# Patient Record
Sex: FEMALE | Race: WHITE | NOT HISPANIC OR LATINO | Employment: FULL TIME | URBAN - METROPOLITAN AREA
[De-identification: names, ages, dates, MRNs, and addresses within clinical notes are randomized per-mention and may not be internally consistent; named-entity substitution may affect disease eponyms.]

---

## 2021-04-02 ENCOUNTER — TELEPHONE (OUTPATIENT)
Dept: OBGYN CLINIC | Facility: CLINIC | Age: 51
End: 2021-04-02

## 2021-04-02 PROBLEM — Z86.018 HISTORY OF UTERINE FIBROID: Status: ACTIVE | Noted: 2021-04-02

## 2021-04-02 NOTE — TELEPHONE ENCOUNTER
Spoke with pt, aware of u/s with overall smaller uterus and dominant fibroid, curious additional 5 cm fibroid not noted in past (but overall uterus smaller) and normal ovaries  Will decrease progesterone from BID to daily x 20 days next month and call with bleeding issues  Repeat u/s one year or prn changes   Agrees to plan

## 2021-07-22 ENCOUNTER — TELEPHONE (OUTPATIENT)
Dept: OBGYN CLINIC | Facility: CLINIC | Age: 51
End: 2021-07-22

## 2021-07-22 DIAGNOSIS — Z86.018 HISTORY OF UTERINE FIBROID: Primary | ICD-10-CM

## 2021-07-22 NOTE — TELEPHONE ENCOUNTER
Ingrid l/m requesting refill of norethindrone 5mg    L/M on patient work and cell v/m to call back to discuss  See prior notes to decrease to 5mg daily x 20days (5-24)and report any bleeding problems

## 2021-09-28 ENCOUNTER — TELEPHONE (OUTPATIENT)
Dept: OBGYN CLINIC | Facility: CLINIC | Age: 51
End: 2021-09-28

## 2021-09-28 NOTE — TELEPHONE ENCOUNTER
Ingrid levin she was instructed by Dr Jarrod Morejon to immediately stop her norethindrone due to abnormal labs  Return call to [facility], inquired on what were the abnormal labs  She states her hemoglobin and hematocrit were elevated and she recently had to start medication for high blood pressure  Dr Bennie Butler feels it is due to the norethindrone  She is going to recheck labs in a few months  Advised will forward to DR Chacon to review

## 2021-09-28 NOTE — TELEPHONE ENCOUNTER
Spoke with pt, will drop off copy of labs in [facility] for review  Pt would like to stop aygestin, due to increase in hg/hct and BP concerns  Cycle warnings given  Agrees to plan

## 2022-05-11 PROBLEM — D25.0 SUBMUCOUS LEIOMYOMA OF UTERUS: Status: ACTIVE | Noted: 2022-05-11

## 2022-05-11 PROBLEM — Z85.820 PERSONAL HISTORY OF MALIGNANT MELANOMA: Status: ACTIVE | Noted: 2022-05-11

## 2022-05-11 NOTE — PROGRESS NOTES
Assessment/Plan:    Encounter for gynecological examination (general) (routine) without abnormal findings  Stopped progesterone cycling in 9/2021, tolerable irregular, less heavy menses to follow  LMP 3/27/22  No complaints  Normal breast and pelvic exams  Pap done  Mammo order given  Up to date with colonoscopy  History of uterine fibroid  Last u/s 4/2021  Normal exam  Will recheck u/s, order given       Diagnoses and all orders for this visit:    Encounter for gynecological examination (general) (routine) without abnormal findings    Encounter for screening mammogram for breast cancer  -     Mammo screening bilateral w 3d & cad; Future    History of uterine fibroid  -     US pelvis complete w transvaginal; Future    Screening for malignant neoplasm of the cervix  -     Thinprep Tis Pap and HPV mRNA E6/E7 Reflex HPV 16,18/45    Other orders  -     nebivolol (BYSTOLIC) 10 mg tablet; Take 10 mg by mouth in the morning  Subjective:      Patient ID: Camilo Gowers is a 46 y o  female  Here for well check  The following portions of the patient's history were reviewed and updated as appropriate:   She  has a past medical history of H/O mammogram (08/2020), Personal history of malignant melanoma, and Submucous leiomyoma of uterus  She   Patient Active Problem List    Diagnosis Date Noted    Encounter for gynecological examination (general) (routine) without abnormal findings 05/12/2022    Submucous leiomyoma of uterus 05/11/2022    Personal history of malignant melanoma 05/11/2022    History of uterine fibroid 04/02/2021     She  has a past surgical history that includes Knee arthroscopy w/ meniscectomy (Left); Mammo (historical) (08/2020); and Colonoscopy (05/2021)  Her family history is not on file  She  reports that she has never smoked  She has never used smokeless tobacco  She reports current alcohol use of about 1 0 standard drink of alcohol per week   She reports that she does not use drugs  Current Outpatient Medications   Medication Sig Dispense Refill    nebivolol (BYSTOLIC) 10 mg tablet Take 10 mg by mouth in the morning   norethindrone (AYGESTIN) 5 mg tablet Take one tablet daily, days 5-24 of cycle  60 tablet 3     No current facility-administered medications for this visit  She is allergic to compazine [prochlorperazine]       Review of Systems  No breast, bladder, bowel changes   No new persistent pain, bloating, early satiety or pelvic pressure      Objective:      /70   Ht 5' 6" (1 676 m)   Wt 74 8 kg (165 lb)   LMP 03/27/2022 (Exact Date) Comment: irregular  Breastfeeding No   BMI 26 63 kg/m²          Physical Exam  General appearance: no distress, pleasant  Neck: thyroid without nodules or thyromegaly, no palpable adenopathy  Lymph nodes: no palpable adenopathy  Breasts: no masses, nodes or skin changes  Abdomen: soft, non tender, no palpable masses  Pelvic exam: normal external genitalia, urethral meatus normal, vagina without lesions, cervix without lesions, uterus small, non tender, no adnexal masses, non tender  Rectal exam: no masses, non tender, RV confirms above

## 2022-05-12 ENCOUNTER — ANNUAL EXAM (OUTPATIENT)
Dept: OBGYN CLINIC | Facility: CLINIC | Age: 52
End: 2022-05-12
Payer: COMMERCIAL

## 2022-05-12 VITALS
WEIGHT: 165 LBS | DIASTOLIC BLOOD PRESSURE: 70 MMHG | BODY MASS INDEX: 26.52 KG/M2 | HEIGHT: 66 IN | SYSTOLIC BLOOD PRESSURE: 110 MMHG

## 2022-05-12 DIAGNOSIS — Z01.419 ENCOUNTER FOR GYNECOLOGICAL EXAMINATION (GENERAL) (ROUTINE) WITHOUT ABNORMAL FINDINGS: Primary | ICD-10-CM

## 2022-05-12 DIAGNOSIS — Z12.31 ENCOUNTER FOR SCREENING MAMMOGRAM FOR BREAST CANCER: ICD-10-CM

## 2022-05-12 DIAGNOSIS — Z86.018 HISTORY OF UTERINE FIBROID: ICD-10-CM

## 2022-05-12 DIAGNOSIS — Z12.4 SCREENING FOR MALIGNANT NEOPLASM OF THE CERVIX: ICD-10-CM

## 2022-05-12 PROCEDURE — S0612 ANNUAL GYNECOLOGICAL EXAMINA: HCPCS | Performed by: OBSTETRICS & GYNECOLOGY

## 2022-05-12 RX ORDER — NEBIVOLOL 10 MG/1
10 TABLET ORAL DAILY
COMMUNITY
Start: 2022-05-02

## 2022-05-12 NOTE — LETTER
May 12, 2022     Dariela Gentile DO  [address] [facility],[address]  [address] [place] [address]    Patient: Faby Bowles   YOB: 1970   Date of Visit: 5/12/2022       Dear Dr Cori Valerio: Thank you for referring Faby Bowles to me for evaluation  Below are my notes for this consultation  If you have questions, please do not hesitate to call me  I look forward to following your patient along with you  Sincerely,        Dc Aguilar MD        CC: No Recipients  Dc Aguilar MD  5/12/2022  3:44 PM  Incomplete  Assessment/Plan:    Encounter for gynecological examination (general) (routine) without abnormal findings  Stopped progesterone cycling in 9/2021, tolerable irregular, less heavy menses to follow  LMP 3/27/22  No complaints  Normal breast and pelvic exams  Pap done  Mammo order given  Up to date with colonoscopy  History of uterine fibroid  Last u/s 4/2021  Normal exam  Will recheck u/s, order given       Diagnoses and all orders for this visit:    Encounter for gynecological examination (general) (routine) without abnormal findings    Encounter for screening mammogram for breast cancer  -     Mammo screening bilateral w 3d & cad; Future    History of uterine fibroid  -     US pelvis complete w transvaginal; Future    Screening for malignant neoplasm of the cervix  -     Thinprep Tis Pap and HPV mRNA E6/E7 Reflex HPV 16,18/45    Other orders  -     nebivolol (BYSTOLIC) 10 mg tablet; Take 10 mg by mouth in the morning  Subjective:      Patient ID: Faby Bowles is a 46 y o  female  Here for well check  The following portions of the patient's history were reviewed and updated as appropriate:   She  has a past medical history of H/O mammogram (08/2020), Personal history of malignant melanoma, and Submucous leiomyoma of uterus    She   Patient Active Problem List    Diagnosis Date Noted    Encounter for gynecological examination (general) (routine) without abnormal findings 05/12/2022    Submucous leiomyoma of uterus 05/11/2022    Personal history of malignant melanoma 05/11/2022    History of uterine fibroid 04/02/2021     She  has a past surgical history that includes Knee arthroscopy w/ meniscectomy (Left); Mammo (historical) (08/2020); and Colonoscopy (05/2021)  Her family history is not on file  She  reports that she has never smoked  She has never used smokeless tobacco  She reports current alcohol use of about 1 0 standard drink of alcohol per week  She reports that she does not use drugs  Current Outpatient Medications   Medication Sig Dispense Refill    nebivolol (BYSTOLIC) 10 mg tablet Take 10 mg by mouth in the morning   norethindrone (AYGESTIN) 5 mg tablet Take one tablet daily, days 5-24 of cycle  60 tablet 3     No current facility-administered medications for this visit  She is allergic to compazine [prochlorperazine]       Review of Systems  No breast, bladder, bowel changes  No new persistent pain, bloating, early satiety or pelvic pressure      Objective:      /70   Ht 5' 6" (1 676 m)   Wt 74 8 kg (165 lb)   LMP 03/27/2022 (Exact Date) Comment: irregular  Breastfeeding No   BMI 26 63 kg/m²          Physical Exam  General appearance: no distress, pleasant  Neck: thyroid without nodules or thyromegaly, no palpable adenopathy  Lymph nodes: no palpable adenopathy  Breasts: no masses, nodes or skin changes  Abdomen: soft, non tender, no palpable masses  Pelvic exam: normal external genitalia, urethral meatus normal, vagina without lesions, cervix without lesions, uterus small, non tender, no adnexal masses, non tender  Rectal exam: no masses, non tender, RV confirms above      Diane Killina MD  5/11/2022 10:34 AM  Sign when Signing Visit  Assessment/Plan:    No problem-specific Assessment & Plan notes found for this encounter         Diagnoses and all orders for this visit:    Encounter for screening mammogram for breast cancer Subjective:      Patient ID: Tejas Damian is a 46 y o  female  Here for well check  The following portions of the patient's history were reviewed and updated as appropriate:   She  has a past medical history of H/O mammogram (08/2020), Personal history of malignant melanoma, and Submucous leiomyoma of uterus  She   Patient Active Problem List    Diagnosis Date Noted    Submucous leiomyoma of uterus 05/11/2022    Personal history of malignant melanoma 05/11/2022    History of uterine fibroid 04/02/2021     She  has a past surgical history that includes Knee arthroscopy w/ meniscectomy (Left) and Mammo (historical) (08/2020)  Her family history is not on file  She  reports that she has never smoked  She does not have any smokeless tobacco history on file  No history on file for alcohol use and drug use  Current Outpatient Medications   Medication Sig Dispense Refill    norethindrone (AYGESTIN) 5 mg tablet Take one tablet daily, days 5-24 of cycle  60 tablet 3     No current facility-administered medications for this visit  She has no allergies on file       Review of Systems      Objective: There were no vitals taken for this visit           Physical Exam

## 2022-05-12 NOTE — PATIENT INSTRUCTIONS
Return to office in one year unless having any problems such as breast changes, bleeding, new persistent pain, new progressive bloating, new problems eating (getting full to quickly) or new constant urinary pressure that does not resolve in one week  Please call the office if you do not hear about your results in 10-14 days       Best time to get your ultrasound is between days 8-10 of your cycle

## 2022-05-12 NOTE — ASSESSMENT & PLAN NOTE
Stopped progesterone cycling in 9/2021, tolerable irregular, less heavy menses to follow  LMP 3/27/22  No complaints  Normal breast and pelvic exams  Pap done  Mammo order given  Up to date with colonoscopy

## 2022-05-31 ENCOUNTER — TELEPHONE (OUTPATIENT)
Dept: OBGYN CLINIC | Facility: CLINIC | Age: 52
End: 2022-05-31

## 2022-05-31 NOTE — TELEPHONE ENCOUNTER
Please inform of pap with ASCUS, HPV negative, same as in 2020  Expert consensus is to repeat the pap in one year, no additional therapy or testing is indicated  Thanks

## 2022-06-06 ENCOUNTER — TELEPHONE (OUTPATIENT)
Dept: OBGYN CLINIC | Facility: CLINIC | Age: 52
End: 2022-06-06

## 2022-06-06 DIAGNOSIS — N83.292 OTHER OVARIAN CYST, LEFT SIDE: Primary | ICD-10-CM

## 2022-06-06 NOTE — TELEPHONE ENCOUNTER
Reviewed result and recommendation with Christa Ross who is in agreement to plan   Order at  box in [place] at patient request

## 2022-06-06 NOTE — TELEPHONE ENCOUNTER
Please inform of u/s results with smaller fibroids  (RV uterus 9 4 x 7 2 x 6 3 cm with two fundal fibroids 3 4 and 3 8 cm (previously 6 3 and 5 cm)  EMS 8 mm  L ov 6 cm with 4 2 cm simple cyst  R ov 2 8 cm  WNL)    Simple left ovarian cyst noted, recommend f/u imaging in 6-8 weeks to insure resolution  Order placed, look on nursing printer please

## 2022-07-28 ENCOUNTER — TELEPHONE (OUTPATIENT)
Dept: OBGYN CLINIC | Facility: CLINIC | Age: 52
End: 2022-07-28

## 2022-07-28 NOTE — TELEPHONE ENCOUNTER
Please inform of follow up pelvic ultrasound results with resolution of previously seen cyst   Thanks

## 2022-07-28 NOTE — TELEPHONE ENCOUNTER
Spoke with Lior Pan let her know her follow up ultrasound should resolution of the previous cyst   Pt aware and happy with the news

## 2022-10-03 DIAGNOSIS — Z12.31 ENCOUNTER FOR SCREENING MAMMOGRAM FOR BREAST CANCER: ICD-10-CM

## 2022-10-05 ENCOUNTER — TELEPHONE (OUTPATIENT)
Dept: OBGYN CLINIC | Facility: CLINIC | Age: 52
End: 2022-10-05

## 2023-08-01 NOTE — PROGRESS NOTES
Assessment/Plan:    Encounter for gynecological examination (general) (routine) without abnormal findings  All well, no complaints. Skipping cycles, last 23, this is the longest. Tolerable hot flashes. Normal breast and pelvic exams. Last pap 2022 ASCUS/HPV neg,  same, repeated today  Mammo order given, last 22  Colonoscopy , due        Diagnoses and all orders for this visit:    Breast cancer screening by mammogram  -     Mammo screening bilateral w 3d & cad; Future    Encounter for gynecological examination (general) (routine) without abnormal findings    Screening for malignant neoplasm of the cervix  -     Cancel: IGP, Aptima HPV, Rfx 16/18,45  -     IGP, Aptima HPV, Rfx 16/18,45    Other orders  -     Liquid-based pap, screening  -     Cholecalciferol (Vitamin D-3) 125 MCG (5000 UT) TABS; Take by mouth          Subjective:      Patient ID: Shar Noyola is a 45 y.o. female. HPI  Here for well check. The following portions of the patient's history were reviewed and updated as appropriate:   She  has a past medical history of H/O mammogram (2020), Hypertension, Personal history of malignant melanoma, and Submucous leiomyoma of uterus. She   Patient Active Problem List    Diagnosis Date Noted   • Encounter for gynecological examination (general) (routine) without abnormal findings 2022   • Submucous leiomyoma of uterus 2022   • Personal history of malignant melanoma 2022   • History of uterine fibroid 2021     She  has a past surgical history that includes Knee arthroscopy w/ meniscectomy (Left); Mammo (historical) (Bilateral, 2020); Colonoscopy (2021); and  section. Her family history includes Alzheimer's disease in her mother; Heart failure in her father; Lung disease in her son; No Known Problems in her brother, maternal grandfather, and paternal grandmother; Thyroid disease in her daughter. She  reports that she has never smoked. She has never used smokeless tobacco. She reports current alcohol use of about 4.0 standard drinks of alcohol per week. She reports that she does not use drugs. Current Outpatient Medications   Medication Sig Dispense Refill   • Cholecalciferol (Vitamin D-3) 125 MCG (5000 UT) TABS Take by mouth     • nebivolol (BYSTOLIC) 10 mg tablet Take 5 mg by mouth daily       No current facility-administered medications for this visit. She is allergic to compazine [prochlorperazine]. .    Review of Systems  No breast, bladder, bowel changes.  No new persistent pain, bloating, early satiety or pelvic pressure      Objective:      /74 (BP Location: Left arm, Patient Position: Sitting, Cuff Size: Large)   Ht 5' 5.75" (1.67 m)   Wt 82.2 kg (181 lb 3.2 oz)   LMP 05/21/2023   BMI 29.47 kg/m²          Physical Exam    General appearance: no distress, pleasant  Neck: thyroid without nodules or thyromegaly, no palpable adenopathy  Lymph nodes: no palpable adenopathy  Breasts: no masses, nodes or skin changes  Abdomen: soft, non tender, no palpable masses  Pelvic exam: normal external genitalia, urethral meatus normal, vagina without lesions, cervix without lesions, uterus small, non tender, no adnexal masses, non tender  Rectal exam: no masses, non tender, RV confirms above

## 2023-08-03 ENCOUNTER — ANNUAL EXAM (OUTPATIENT)
Dept: OBGYN CLINIC | Facility: CLINIC | Age: 53
End: 2023-08-03
Payer: COMMERCIAL

## 2023-08-03 VITALS
SYSTOLIC BLOOD PRESSURE: 122 MMHG | HEIGHT: 66 IN | BODY MASS INDEX: 29.12 KG/M2 | DIASTOLIC BLOOD PRESSURE: 74 MMHG | WEIGHT: 181.2 LBS

## 2023-08-03 DIAGNOSIS — Z12.31 BREAST CANCER SCREENING BY MAMMOGRAM: ICD-10-CM

## 2023-08-03 DIAGNOSIS — Z01.419 ENCOUNTER FOR GYNECOLOGICAL EXAMINATION (GENERAL) (ROUTINE) WITHOUT ABNORMAL FINDINGS: Primary | ICD-10-CM

## 2023-08-03 DIAGNOSIS — Z12.4 SCREENING FOR MALIGNANT NEOPLASM OF THE CERVIX: ICD-10-CM

## 2023-08-03 PROCEDURE — S0612 ANNUAL GYNECOLOGICAL EXAMINA: HCPCS | Performed by: OBSTETRICS & GYNECOLOGY

## 2023-08-03 NOTE — ASSESSMENT & PLAN NOTE
All well, no complaints. Skipping cycles, last 5/21/23, this is the longest. Tolerable hot flashes. Normal breast and pelvic exams.   Last pap 5/2022 ASCUS/HPV neg, 2020 same, repeated today  Mammo order given, last 9/22/22  Colonoscopy 2021, due 2028

## 2023-08-03 NOTE — LETTER
August 3, 2023     Anam Calderón, DO  [address] [address]    Patient: Loree Mo   YOB: 1970   Date of Visit: 8/3/2023       Dear Dr. Francisco Velarde: Thank you for referring Loree Mo to me for evaluation. Below are my notes for this consultation. If you have questions, please do not hesitate to call me. I look forward to following your patient along with you. Sincerely,        Van Martinez MD        CC: No Recipients    Van Martinez MD  8/3/2023  3:26 PM  Sign when Signing Visit  Assessment/Plan:    Encounter for gynecological examination (general) (routine) without abnormal findings  All well, no complaints. Skipping cycles, last 5/21/23, this is the longest. Tolerable hot flashes. Normal breast and pelvic exams. Last pap 5/2022 ASCUS/HPV neg, 2020 same, repeated today  Mammo order given, last 9/22/22  Colonoscopy 2021, due 2028      Diagnoses and all orders for this visit:    Breast cancer screening by mammogram  -     Mammo screening bilateral w 3d & cad; Future    Encounter for gynecological examination (general) (routine) without abnormal findings    Screening for malignant neoplasm of the cervix  -     Cancel: IGP, Aptima HPV, Rfx 16/18,45  -     IGP, Aptima HPV, Rfx 16/18,45    Other orders  -     Liquid-based pap, screening  -     Cholecalciferol (Vitamin D-3) 125 MCG (5000 UT) TABS; Take by mouth         Subjective:     Patient ID: Loree Mo is a 45 y.o. female. HPI  Here for well check. The following portions of the patient's history were reviewed and updated as appropriate:   She  has a past medical history of H/O mammogram (08/2020), Hypertension, Personal history of malignant melanoma, and Submucous leiomyoma of uterus.   She   Patient Active Problem List    Diagnosis Date Noted   • Encounter for gynecological examination (general) (routine) without abnormal findings 05/12/2022   • Submucous leiomyoma of uterus 05/11/2022   • Personal history of malignant melanoma 2022   • History of uterine fibroid 2021     She  has a past surgical history that includes Knee arthroscopy w/ meniscectomy (Left); Mammo (historical) (Bilateral, 2020); Colonoscopy (2021); and  section. Her family history includes Alzheimer's disease in her mother; Heart failure in her father; Lung disease in her son; No Known Problems in her brother, maternal grandfather, and paternal grandmother; Thyroid disease in her daughter. She  reports that she has never smoked. She has never used smokeless tobacco. She reports current alcohol use of about 4.0 standard drinks of alcohol per week. She reports that she does not use drugs. Current Outpatient Medications   Medication Sig Dispense Refill   • Cholecalciferol (Vitamin D-3) 125 MCG (5000 UT) TABS Take by mouth     • nebivolol (BYSTOLIC) 10 mg tablet Take 5 mg by mouth daily       No current facility-administered medications for this visit. She is allergic to compazine [prochlorperazine]. .    Review of Systems No breast, bladder, bowel changes.  No new persistent pain, bloating, early satiety or pelvic pressure      Objective:      /74 (BP Location: Left arm, Patient Position: Sitting, Cuff Size: Large)   Ht 5' 5.75" (1.67 m)   Wt 82.2 kg (181 lb 3.2 oz)   LMP 2023   BMI 29.47 kg/m²         Physical Exam   General appearance: no distress, pleasant  Neck: thyroid without nodules or thyromegaly, no palpable adenopathy  Lymph nodes: no palpable adenopathy  Breasts: no masses, nodes or skin changes  Abdomen: soft, non tender, no palpable masses  Pelvic exam: normal external genitalia, urethral meatus normal, vagina without lesions, cervix without lesions, uterus small, non tender, no adnexal masses, non tender  Rectal exam: no masses, non tender, RV confirms above

## 2023-08-03 NOTE — PATIENT INSTRUCTIONS
Return to office in one year unless having any problems such as breast changes, bleeding, new persistent pain, new progressive bloating, new problems eating (getting full to quickly) or new constant urinary pressure that does not resolve in one week. Call in six months to schedule your annual visit. Try Estroven for the hot flashes.

## 2023-08-08 LAB
CYTOLOGIST CVX/VAG CYTO: NORMAL
DX ICD CODE: NORMAL
HPV GENOTYPE REFLEX: NORMAL
HPV I/H RISK 4 DNA CVX QL PROBE+SIG AMP: NEGATIVE
OTHER STN SPEC: NORMAL
PATH REPORT.FINAL DX SPEC: NORMAL
SL AMB NOTE:: NORMAL
SL AMB SPECIMEN ADEQUACY: NORMAL
SL AMB TEST METHODOLOGY: NORMAL

## 2023-12-07 DIAGNOSIS — Z12.31 BREAST CANCER SCREENING BY MAMMOGRAM: ICD-10-CM

## 2024-02-21 PROBLEM — Z01.419 ENCOUNTER FOR GYNECOLOGICAL EXAMINATION (GENERAL) (ROUTINE) WITHOUT ABNORMAL FINDINGS: Status: RESOLVED | Noted: 2022-05-12 | Resolved: 2024-02-21

## 2024-12-18 NOTE — PROGRESS NOTES
Assessment/Plan:    Encounter for gynecological examination (general) (routine) without abnormal findings  Here for well check, LMP light 3/2024,  almost one year prior.   Reports hfs, night sweats.   No other breast or gyn concerns.    Normal breast and pelvic exams.  Last pap 2023 neg/HPV neg; due (h/o ASCUS HPV neg in past)  Mammo order given, last 24 BIRADS 1C, reviewed on [email or web address]  Colonoscopy , due   Dexa @63, no risk factors. Calcium recs reviewed    History of uterine fibroid  Last u/s reviewed from 2022 - RV/RF uterus 8 x 8 cm with right 3.7; left 3.0 and posterior 2 cm fibroids. EMS 4 mm. R ov 2.3 cm; L ov 2.8 cm. Resolution of previously seen cyst.   Discussed repeat imaging for symptoms or change in pelvic exam.   Agrees to plan.        Diagnoses and all orders for this visit:    Breast cancer screening by mammogram  -     Mammo screening bilateral w 3d and cad; Future    Encounter for gynecological examination (general) (routine) without abnormal findings    History of uterine fibroid    Other orders  -     nebivolol (BYSTOLIC) 5 mg tablet; Take 1 tablet by mouth in the morning  -     Liquid-based pap, screening          Subjective:      Patient ID: Ingrid Houser is a 54 y.o. female.    HPI Here for well check.    The following portions of the patient's history were reviewed and updated as appropriate: She  has a past medical history of H/O mammogram (2020), Hypertension, Personal history of malignant melanoma, and Submucous leiomyoma of uterus.  She   Patient Active Problem List    Diagnosis Date Noted    Submucous leiomyoma of uterus 2022    Personal history of malignant melanoma 2022    History of uterine fibroid 2021     She  has a past surgical history that includes Knee arthroscopy w/ meniscectomy (Left); Mammo (historical) (Bilateral, 2020); Colonoscopy (2021); and  section.  Her family history includes Alzheimer's disease in her  "mother; Heart failure in her father; Lung disease in her son; No Known Problems in her brother, maternal grandfather, and paternal grandmother; Thyroid disease in her daughter.  She  reports that she has never smoked. She has never been exposed to tobacco smoke. She has never used smokeless tobacco. She reports current alcohol use of about 4.0 standard drinks of alcohol per week. She reports that she does not use drugs.  Current Outpatient Medications   Medication Sig Dispense Refill    Cholecalciferol (Vitamin D-3) 125 MCG (5000 UT) TABS Take by mouth      nebivolol (BYSTOLIC) 5 mg tablet Take 1 tablet by mouth in the morning       No current facility-administered medications for this visit.     She is allergic to compazine [prochlorperazine]..    Review of Systems  No breast, bladder, bowel changes. No new persistent pain, bloating, early satiety or pelvic pressure  +hot flashes +night sweats  No vaginal dryness    Objective:    /84 (BP Location: Left arm, Patient Position: Sitting, Cuff Size: Standard)   Ht 5' 6\" (1.676 m)   Wt 85.3 kg (188 lb)   LMP 05/21/2023   BMI 30.34 kg/m²        Physical Exam    General appearance: no distress, pleasant  Neck: thyroid prominent, no palpable adenopathy  Lymph nodes: no palpable adenopathy  Skin with multiple nevi (sees derm regularly)  Breasts: no masses, nodes or skin changes  Abdomen: soft, non tender, no palpable masses  Pelvic exam: normal atrophic external genitalia, urethral meatus normal, vagina atrophic without lesions, cervix atrophic without lesions, uterus small, non tender, no adnexal masses, non tender  Rectal exam: normal sphincter tone, no masses, RV confirms above    "

## 2024-12-19 ENCOUNTER — ANNUAL EXAM (OUTPATIENT)
Dept: OBGYN CLINIC | Facility: CLINIC | Age: 54
End: 2024-12-19
Payer: COMMERCIAL

## 2024-12-19 VITALS
BODY MASS INDEX: 30.22 KG/M2 | HEIGHT: 66 IN | WEIGHT: 188 LBS | DIASTOLIC BLOOD PRESSURE: 84 MMHG | SYSTOLIC BLOOD PRESSURE: 124 MMHG

## 2024-12-19 DIAGNOSIS — Z01.419 ENCOUNTER FOR GYNECOLOGICAL EXAMINATION (GENERAL) (ROUTINE) WITHOUT ABNORMAL FINDINGS: Primary | ICD-10-CM

## 2024-12-19 DIAGNOSIS — Z86.018 HISTORY OF UTERINE FIBROID: ICD-10-CM

## 2024-12-19 DIAGNOSIS — Z12.31 BREAST CANCER SCREENING BY MAMMOGRAM: ICD-10-CM

## 2024-12-19 PROCEDURE — S0612 ANNUAL GYNECOLOGICAL EXAMINA: HCPCS | Performed by: OBSTETRICS & GYNECOLOGY

## 2024-12-19 RX ORDER — NEBIVOLOL 5 MG/1
1 TABLET ORAL DAILY
COMMUNITY
Start: 2024-11-27

## 2024-12-19 NOTE — ASSESSMENT & PLAN NOTE
Last u/s reviewed from 7/2022 - RV/RF uterus 8 x 8 cm with right 3.7; left 3.0 and posterior 2 cm fibroids. EMS 4 mm. R ov 2.3 cm; L ov 2.8 cm. Resolution of previously seen cyst.   Discussed repeat imaging for symptoms or change in pelvic exam.   Agrees to plan.

## 2024-12-19 NOTE — ASSESSMENT & PLAN NOTE
Here for well check, LMP light 3/2024,  almost one year prior.   Reports hfs, night sweats.   No other breast or gyn concerns.    Normal breast and pelvic exams.  Last pap 8/2023 neg/HPV neg; due 2025(h/o ASCUS HPV neg in past)  Mammo order given, last 11/1/24 BIRADS 1C, reviewed on [facility]  Colonoscopy 2021, due 2028  Dexa @63, no risk factors. Calcium recs reviewed

## 2024-12-19 NOTE — PATIENT INSTRUCTIONS
Avoid things that exacerbate hot flashes - alcohol (especially red wine), spicy foods, hot liquids, stress. Deep breathing exercises can decrease the frequency and intensity of the flashes. Try to slow your breathing down to 8 breaths a minute, several times per day.  Natural herbs (black cohosh which is available over the counter in products such as Remifemin and Estroven) can be helpful.   Hormone replacement therapy in addition to other prescription medications are also available if the above are not helpful.    Return to office in one year unless having any problems such as breast changes, bleeding, new persistent pain, new progressive bloating, new problems eating (getting full to quickly) or new constant urinary pressure that does not resolve in one week.    Continue to strive for 1200 mg of calcium and 1000 IU of vitamin D daily in diet and supplements combined for your bone health.  You can only absorb 600 mg of calcium at a time. Avoid excess calcium as this may adversely effect your heart.  There are 400 mg of calcium in an 8 oz serving of dairy.  [place] milk has 600 mg of calcium in an 8 oz serving.

## 2024-12-19 NOTE — LETTER
December 19, 2024     Blanca Steiner DO  [address] [facility]  [address]  [place] [address]    Patient: Ingrid Houser   YOB: 1970   Date of Visit: 12/19/2024       Dear Dr. Steiner:    Ingrid Houser was in today for her annual gyn exam. Below are my notes for this visit.    If you have questions, please do not hesitate to call me. I look forward to following your patient along with you.         Sincerely,        Earline Chacon MD        CC: No Recipients    Earline Chacon MD  12/19/2024 11:31 AM  Sign when Signing Visit  Assessment/Plan:    Encounter for gynecological examination (general) (routine) without abnormal findings  Here for well check, LMP light 3/2024,  almost one year prior.   Reports hfs, night sweats.   No other breast or gyn concerns.    Normal breast and pelvic exams.  Last pap 8/2023 neg/HPV neg; due 2025(h/o ASCUS HPV neg in past)  Mammo order given, last 11/1/24 BIRADS 1C, reviewed on [facility]  Colonoscopy 2021, due 2028  Dexa @65, no risk factors. Calcium recs reviewed    History of uterine fibroid  Last u/s reviewed from 7/2022 - RV/RF uterus 8 x 8 cm with right 3.7; left 3.0 and posterior 2 cm fibroids. EMS 4 mm. R ov 2.3 cm; L ov 2.8 cm. Resolution of previously seen cyst.   Discussed repeat imaging for symptoms or change in pelvic exam.   Agrees to plan.        Diagnoses and all orders for this visit:    Breast cancer screening by mammogram  -     Mammo screening bilateral w 3d and cad; Future    Encounter for gynecological examination (general) (routine) without abnormal findings    History of uterine fibroid    Other orders  -     nebivolol (BYSTOLIC) 5 mg tablet; Take 1 tablet by mouth in the morning  -     Liquid-based pap, screening          Subjective:      Patient ID: Ingrid Houser is a 54 y.o. female.    HPI Here for well check.    The following portions of the patient's history were reviewed and updated as appropriate: She  has a past medical history of H/O  "mammogram (2020), Hypertension, Personal history of malignant melanoma, and Submucous leiomyoma of uterus.  She   Patient Active Problem List    Diagnosis Date Noted   • Submucous leiomyoma of uterus 2022   • Personal history of malignant melanoma 2022   • History of uterine fibroid 2021     She  has a past surgical history that includes Knee arthroscopy w/ meniscectomy (Left); Mammo (historical) (Bilateral, 2020); Colonoscopy (2021); and  section.  Her family history includes Alzheimer's disease in her mother; Heart failure in her father; Lung disease in her son; No Known Problems in her brother, maternal grandfather, and paternal grandmother; Thyroid disease in her daughter.  She  reports that she has never smoked. She has never been exposed to tobacco smoke. She has never used smokeless tobacco. She reports current alcohol use of about 4.0 standard drinks of alcohol per week. She reports that she does not use drugs.  Current Outpatient Medications   Medication Sig Dispense Refill   • Cholecalciferol (Vitamin D-3) 125 MCG (5000 UT) TABS Take by mouth     • nebivolol (BYSTOLIC) 5 mg tablet Take 1 tablet by mouth in the morning       No current facility-administered medications for this visit.     She is allergic to compazine [prochlorperazine]..    Review of Systems  No breast, bladder, bowel changes. No new persistent pain, bloating, early satiety or pelvic pressure  +hot flashes +night sweats  No vaginal dryness    Objective:    /84 (BP Location: Left arm, Patient Position: Sitting, Cuff Size: Standard)   Ht 5' 6\" (1.676 m)   Wt 85.3 kg (188 lb)   LMP 2023   BMI 30.34 kg/m²        Physical Exam    General appearance: no distress, pleasant  Neck: thyroid prominent, no palpable adenopathy  Lymph nodes: no palpable adenopathy  Skin with multiple nevi (sees derm regularly)  Breasts: no masses, nodes or skin changes  Abdomen: soft, non tender, no palpable " masses  Pelvic exam: normal atrophic external genitalia, urethral meatus normal, vagina atrophic without lesions, cervix atrophic without lesions, uterus small, non tender, no adnexal masses, non tender  Rectal exam: normal sphincter tone, no masses, RV confirms above